# Patient Record
Sex: MALE | Employment: OTHER
[De-identification: names, ages, dates, MRNs, and addresses within clinical notes are randomized per-mention and may not be internally consistent; named-entity substitution may affect disease eponyms.]

---

## 2021-05-01 ENCOUNTER — OFFICE VISIT (OUTPATIENT)
Age: 71
End: 2021-05-01

## 2021-06-01 ENCOUNTER — OFFICE VISIT (OUTPATIENT)
Age: 71
End: 2021-06-01

## 2021-06-22 NOTE — PATIENT DISCUSSION
Pataday qd, AT's PRN, Cold compresses PRN, Allergy lid scrubs given qd. Ok to start CL's in 3 days.  F/U PRN comp exam.

## 2021-07-07 ENCOUNTER — NEW PATIENT COMPREHENSIVE (OUTPATIENT)
Dept: URBAN - METROPOLITAN AREA CLINIC 25 | Facility: CLINIC | Age: 71
End: 2021-07-07

## 2021-07-07 DIAGNOSIS — H52.4: ICD-10-CM

## 2021-07-07 DIAGNOSIS — H52.203: ICD-10-CM

## 2021-07-07 PROCEDURE — 92015 DETERMINE REFRACTIVE STATE: CPT

## 2021-07-07 PROCEDURE — 92499RSE RETINAL SCREENING ELECTIVE

## 2021-07-07 PROCEDURE — 92004 COMPRE OPH EXAM NEW PT 1/>: CPT

## 2021-07-07 ASSESSMENT — TONOMETRY
OD_IOP_MMHG: 11
OS_IOP_MMHG: 17

## 2021-07-07 ASSESSMENT — KERATOMETRY
OD_K1POWER_DIOPTERS: 42.50
OD_AXISANGLE2_DEGREES: 10
OS_K1POWER_DIOPTERS: 42.00
OD_K2POWER_DIOPTERS: 44.00
OS_K2POWER_DIOPTERS: 43.25
OS_AXISANGLE2_DEGREES: 173
OD_AXISANGLE_DEGREES: 100
OS_AXISANGLE_DEGREES: 83

## 2021-07-07 ASSESSMENT — VISUAL ACUITY
OS_SC: 20/50
OD_SC: 20/25

## 2021-12-01 ENCOUNTER — OFFICE VISIT (OUTPATIENT)
Age: 71
End: 2021-12-01

## 2022-03-16 ENCOUNTER — OFFICE VISIT (OUTPATIENT)
Dept: URBAN - METROPOLITAN AREA CLINIC 9 | Facility: CLINIC | Age: 72
End: 2022-03-16

## 2022-03-31 ENCOUNTER — OFFICE VISIT (OUTPATIENT)
Dept: URBAN - METROPOLITAN AREA SURGERY CENTER 9 | Facility: SURGERY CENTER | Age: 72
End: 2022-03-31

## 2022-04-11 ENCOUNTER — TELEPHONE ENCOUNTER (OUTPATIENT)
Dept: URBAN - METROPOLITAN AREA CLINIC 9 | Facility: CLINIC | Age: 72
End: 2022-04-11

## 2022-04-21 ENCOUNTER — OFFICE VISIT (OUTPATIENT)
Dept: URBAN - METROPOLITAN AREA CLINIC 9 | Facility: CLINIC | Age: 72
End: 2022-04-21

## 2022-05-06 ENCOUNTER — TELEPHONE ENCOUNTER (OUTPATIENT)
Dept: URBAN - METROPOLITAN AREA CLINIC 9 | Facility: CLINIC | Age: 72
End: 2022-05-06

## 2022-07-30 ENCOUNTER — TELEPHONE ENCOUNTER (OUTPATIENT)
Age: 72
End: 2022-07-30

## 2022-07-31 ENCOUNTER — TELEPHONE ENCOUNTER (OUTPATIENT)
Age: 72
End: 2022-07-31

## 2022-08-30 ENCOUNTER — ESTABLISHED PATIENT (OUTPATIENT)
Dept: URBAN - METROPOLITAN AREA CLINIC 25 | Facility: CLINIC | Age: 72
End: 2022-08-30

## 2022-08-30 DIAGNOSIS — H52.4: ICD-10-CM

## 2022-08-30 DIAGNOSIS — H52.203: ICD-10-CM

## 2022-08-30 PROCEDURE — 92015 DETERMINE REFRACTIVE STATE: CPT

## 2022-08-30 PROCEDURE — 92014 COMPRE OPH EXAM EST PT 1/>: CPT

## 2022-08-30 PROCEDURE — 92499RSE RETINAL SCREENING ELECTIVE

## 2022-08-30 ASSESSMENT — KERATOMETRY
OD_AXISANGLE_DEGREES: 101
OD_AXISANGLE2_DEGREES: 10
OD_AXISANGLE_DEGREES: 100
OS_AXISANGLE2_DEGREES: 174
OD_K2POWER_DIOPTERS: 44.00
OS_AXISANGLE_DEGREES: 84
OS_K1POWER_DIOPTERS: 42.00
OS_AXISANGLE_DEGREES: 83
OS_K2POWER_DIOPTERS: 43.25
OD_K1POWER_DIOPTERS: 42.50
OS_AXISANGLE2_DEGREES: 173
OS_K1POWER_DIOPTERS: 41.50
OD_AXISANGLE2_DEGREES: 11

## 2022-08-30 ASSESSMENT — VISUAL ACUITY
OD_CC: 20/25-2
OS_CC: 20/25

## 2022-08-30 ASSESSMENT — TONOMETRY
OS_IOP_MMHG: 17
OD_IOP_MMHG: 12